# Patient Record
Sex: FEMALE | Race: WHITE | URBAN - METROPOLITAN AREA
[De-identification: names, ages, dates, MRNs, and addresses within clinical notes are randomized per-mention and may not be internally consistent; named-entity substitution may affect disease eponyms.]

---

## 2017-04-26 ENCOUNTER — OFFICE VISIT (OUTPATIENT)
Dept: OPTOMETRY | Facility: CLINIC | Age: 47
End: 2017-04-26
Payer: COMMERCIAL

## 2017-04-26 DIAGNOSIS — H52.13 MYOPIA WITH PRESBYOPIA, BILATERAL: Primary | ICD-10-CM

## 2017-04-26 DIAGNOSIS — H52.4 MYOPIA WITH PRESBYOPIA, BILATERAL: Primary | ICD-10-CM

## 2017-04-26 PROCEDURE — 92014 COMPRE OPH EXAM EST PT 1/>: CPT | Performed by: OPTOMETRIST

## 2017-04-26 PROCEDURE — 92015 DETERMINE REFRACTIVE STATE: CPT | Performed by: OPTOMETRIST

## 2017-04-26 ASSESSMENT — VISUAL ACUITY
OS_SC: 20/40
OS_CC: 20/20
OD_SC: 20/40
OS_CC: 20/80
OD_SC: 20/60
OD_CC: 20/60
METHOD: SNELLEN - LINEAR
OS_SC: 20/60
OD_SC+: +2
OD_CC: 20/25
CORRECTION_TYPE: GLASSES

## 2017-04-26 ASSESSMENT — CONF VISUAL FIELD
OD_NORMAL: 1
OS_NORMAL: 1
METHOD: COUNTING FINGERS

## 2017-04-26 ASSESSMENT — REFRACTION_MANIFEST
OS_SPHERE: -1.00
OS_SPHERE: -0.75
OS_CYLINDER: +0.25
OS_ADD: +1.50
METHOD_AUTOREFRACTION: 1
OD_ADD: +1.50
OS_AXIS: 061
OD_SPHERE: -0.75
OD_SPHERE: -1.00

## 2017-04-26 ASSESSMENT — REFRACTION_WEARINGRX
OD_ADD: +1.50
OD_CYLINDER: +0.50
OD_SPHERE: -1.00
OS_ADD: +1.50
SPECS_TYPE: PAL
OS_SPHERE: -0.75
OD_AXIS: 175

## 2017-04-26 ASSESSMENT — EXTERNAL EXAM - LEFT EYE: OS_EXAM: NORMAL

## 2017-04-26 ASSESSMENT — EXTERNAL EXAM - RIGHT EYE: OD_EXAM: NORMAL

## 2017-04-26 ASSESSMENT — CUP TO DISC RATIO
OD_RATIO: 0.4 UD
OS_RATIO: 0.4 UD

## 2017-04-26 ASSESSMENT — TONOMETRY
IOP_METHOD: APPLANATION
OS_IOP_MMHG: 21
OD_IOP_MMHG: 20

## 2017-04-26 ASSESSMENT — SLIT LAMP EXAM - LIDS
COMMENTS: NORMAL
COMMENTS: NORMAL

## 2017-04-26 NOTE — PROGRESS NOTES
Chief Complaint   Patient presents with     COMPREHENSIVE EYE EXAM         Pt will feel pressure behind left eye and will feel pain.  Pt feels like she has a film over eyes.  Not constant and happens one eye at a time.  Both symptoms come and go    Last Eye Exam: 3/10/2016  Dilated Previously: Yes    What are you currently using to see?  glasses       Distance Vision Acuity: Noticed gradual change in both eyes    Near Vision Acuity: Not satisfied     Eye Comfort: dry  Do you use eye drops? : No  Occupation or Hobbies: Processes medical claims    Bernadine Brionesquist  [employer]            Medical, surgical and family histories reviewed and updated 4/26/2017.       OBJECTIVE: See Ophthalmology exam    ASSESSMENT:    ICD-10-CM    1. Myopia with presbyopia, bilateral H52.13 EYE EXAM (SIMPLE-NONBILLABLE)    H52.4 REFRACTION      PLAN:     Patient Instructions   There was a very small change in the prescription for your glasses.    Your eyes may be blurry at near and sensitive to light for several hours from the dilating drops.    Yearly eye exams recommended.

## 2017-04-26 NOTE — MR AVS SNAPSHOT
After Visit Summary   4/26/2017    Nahed Renteria    MRN: [id number]           Patient Information     Date Of Birth          1970        Visit Information        Provider Department      4/26/2017 3:30 PM Evette Ricci OD [facility]        Today's Diagnoses     Myopia with presbyopia, bilateral    -  1      Care Instructions    There was a very small change in the prescription for your glasses.    Your eyes may be blurry at near and sensitive to light for several hours from the dilating drops.    Yearly eye exams recommended.        Follow-ups after your visit        Follow-up notes from your care team     Return in about 1 year (around 4/26/2018) for comprehensive eye exam.      Who to contact     If you have questions or need follow up information about today's clinic visit or your schedule please contact [facility] directly at [phone].  Normal or non-critical lab and imaging results will be communicated to you by MyChart, letter or phone within 4 business days after the clinic has received the results. If you do not hear from us within 7 days, please contact the clinic through oLyfehart or phone. If you have a critical or abnormal lab result, we will notify you by phone as soon as possible.  Submit refill requests through [employer] or call your pharmacy and they will forward the refill request to us. Please allow 3 business days for your refill to be completed.          Additional Information About Your Visit        MyChart Information     [employer] gives you secure access to your electronic health record. If you see a primary care provider, you can also send messages to your care team and make appointments. If you have questions, please call your primary care clinic.  If you do not have a primary care provider, please call [phone] and they will assist you.        Care EveryWhere ID     This is your Care EveryWhere ID. This could be used  by other organizations to access your [place] medical records  [id number]         Blood Pressure from Last 3 Encounters:   07/29/15 115/80   06/22/15 121/86   06/08/15 (!) 114/91    Weight from Last 3 Encounters:   09/09/15 85.9 kg (189 lb 6.4 oz)   08/12/15 85.4 kg (188 lb 3.2 oz)   07/29/15 86.2 kg (190 lb)              We Performed the Following     EYE EXAM (SIMPLE-NONBILLABLE)     REFRACTION        Primary Care Provider Office Phone # Fax #    Aileen Shayna Michel [phone]       [facility] [address]  [facility] [address]        Thank you!     Thank you for choosing [facility]  for your care. Our goal is always to provide you with excellent care. Hearing back from our patients is one way we can continue to improve our services. Please take a few minutes to complete the written survey that you may receive in the mail after your visit with us. Thank you!             Your Updated Medication List - Protect others around you: Learn how to safely use, store and throw away your medicines at www.disposemymeds.org.          This list is accurate as of: 4/26/17 11:59 PM.  Always use your most recent med list.                   Brand Name Dispense Instructions for use    ALEVE PO          order for DME     1 Device    Equipment being ordered: lacing velcro ankle sleeve - RIGHT.

## 2017-04-27 NOTE — PATIENT INSTRUCTIONS
There was a very small change in the prescription for your glasses.    Your eyes may be blurry at near and sensitive to light for several hours from the dilating drops.    Yearly eye exams recommended.

## 2018-02-03 ENCOUNTER — HEALTH MAINTENANCE LETTER (OUTPATIENT)
Age: 48
End: 2018-02-03

## 2018-05-31 ENCOUNTER — OFFICE VISIT (OUTPATIENT)
Dept: OPTOMETRY | Facility: CLINIC | Age: 48
End: 2018-05-31
Payer: COMMERCIAL

## 2018-05-31 DIAGNOSIS — H52.4 MYOPIA WITH ASTIGMATISM AND PRESBYOPIA, BILATERAL: Primary | ICD-10-CM

## 2018-05-31 DIAGNOSIS — H52.13 MYOPIA WITH ASTIGMATISM AND PRESBYOPIA, BILATERAL: Primary | ICD-10-CM

## 2018-05-31 DIAGNOSIS — H52.203 MYOPIA WITH ASTIGMATISM AND PRESBYOPIA, BILATERAL: Primary | ICD-10-CM

## 2018-05-31 DIAGNOSIS — G43.109 OCULAR MIGRAINE: ICD-10-CM

## 2018-05-31 PROCEDURE — 92015 DETERMINE REFRACTIVE STATE: CPT | Performed by: OPTOMETRIST

## 2018-05-31 PROCEDURE — 92014 COMPRE OPH EXAM EST PT 1/>: CPT | Performed by: OPTOMETRIST

## 2018-05-31 ASSESSMENT — VISUAL ACUITY
OS_SC: 20/30
OD_SC: 20/80
METHOD: SNELLEN - LINEAR
OS_SC: 20/80
OD_SC+: -1
OD_SC: 20/30

## 2018-05-31 ASSESSMENT — REFRACTION_MANIFEST
OD_CYLINDER: SPHERE
METHOD_AUTOREFRACTION: 1
OS_AXIS: 066
OS_AXIS: 082
OD_ADD: +2.00
OS_SPHERE: -1.00
OD_SPHERE: -0.75
OD_SPHERE: -0.50
OS_CYLINDER: +0.50
OS_CYLINDER: +0.25
OS_ADD: +2.00
OS_SPHERE: -1.00

## 2018-05-31 ASSESSMENT — TONOMETRY
OD_IOP_MMHG: 15
IOP_METHOD: APPLANATION
OS_IOP_MMHG: 11

## 2018-05-31 ASSESSMENT — REFRACTION_WEARINGRX
OS_ADD: +1.50
OS_SPHERE: -0.75
OD_SPHERE: -0.75
OD_ADD: +1.50

## 2018-05-31 ASSESSMENT — CONF VISUAL FIELD
OS_NORMAL: 1
OD_NORMAL: 1
METHOD: COUNTING FINGERS

## 2018-05-31 ASSESSMENT — EXTERNAL EXAM - RIGHT EYE: OD_EXAM: NORMAL

## 2018-05-31 ASSESSMENT — CUP TO DISC RATIO
OS_RATIO: 0.4 UD
OD_RATIO: 0.4 UD

## 2018-05-31 ASSESSMENT — SLIT LAMP EXAM - LIDS
COMMENTS: NORMAL
COMMENTS: NORMAL

## 2018-05-31 ASSESSMENT — EXTERNAL EXAM - LEFT EYE: OS_EXAM: NORMAL

## 2018-05-31 NOTE — PROGRESS NOTES
Chief Complaint   Patient presents with     COMPREHENSIVE EYE EXAM         Last Eye Exam: 4/26/2017  Dilated Previously: Yes    What are you currently using to see?  Glasses - Nahed is avoiding her glasses - she wears to work or drive at night.  Finds her eyes get really dry when she wears her glasses.    While Pt was doing EOM - she had discomfort in the left eye.      Distance Vision Acuity: Not satisfied    Near Vision Acuity: Not satisfied     Eye Comfort: Pt has like a film over eye and sees an aura.  Pt says can last up to 30 minutes and is hard for her to focus - mainly left eye.  Pt says it happens every other week.  Do you use eye drops? : No  Occupation or Hobbies:  - walking [employer]     Bernadine Zaiseoul  [employer]            Medical, surgical and family histories reviewed and updated 5/31/2018.       OBJECTIVE: See Ophthalmology exam    ASSESSMENT:    ICD-10-CM    1. Myopia with astigmatism and presbyopia, bilateral H52.13 EYE EXAM (SIMPLE-NONBILLABLE)    H52.203 REFRACTION    H52.4    2. Ocular migraine G43.109 EYE EXAM (SIMPLE-NONBILLABLE)      PLAN:     Patient Instructions   Prescription given for glasses.    Bring glasses in to see if they are a digital lens.    Your eyes may be blurry at near and sensitive to light for several hours from the dilating drops.    Yearly eye exams recommended or sooner if concerns.

## 2018-05-31 NOTE — LETTER
5/31/2018         RE: Nahed Renteria  [address]  [facility] [address]        Dear Colleague,    Thank you for referring your patient, Nahed Renteria, to [facility]. Please see a copy of my visit note below.    Chief Complaint   Patient presents with     COMPREHENSIVE EYE EXAM         Last Eye Exam: 4/26/2017  Dilated Previously: Yes    What are you currently using to see?  Glasses - Nahed is avoiding her glasses - she wears to work or drive at night.  Finds her eyes get really dry when she wears her glasses.    While Pt was doing EOM - she had discomfort in the left eye.      Distance Vision Acuity: Not satisfied    Near Vision Acuity: Not satisfied     Eye Comfort: Pt has like a film over eye and sees an aura.  Pt says can last up to 30 minutes and is hard for her to focus - mainly left eye.  Pt says it happens every other week.  Do you use eye drops? : No  Occupation or Hobbies:  - [employer]            Medical, surgical and family histories reviewed and updated 5/31/2018.       OBJECTIVE: See Ophthalmology exam    ASSESSMENT:    ICD-10-CM    1. Myopia with astigmatism and presbyopia, bilateral H52.13 EYE EXAM (SIMPLE-NONBILLABLE)    H52.203 REFRACTION    H52.4    2. Ocular migraine G43.109 EYE EXAM (SIMPLE-NONBILLABLE)      PLAN:     Patient Instructions   Prescription given for glasses.    Bring glasses in to see if they are a digital lens.    Your eyes may be blurry at near and sensitive to light for several hours from the dilating drops.    Yearly eye exams recommended or sooner if concerns.         Again, thank you for allowing me to participate in the care of your patient.        Sincerely,        Evette Ricci, OD

## 2018-05-31 NOTE — MR AVS SNAPSHOT
After Visit Summary   5/31/2018    Nahed Renteria    MRN: [id number]           Patient Information     Date Of Birth          1970        Visit Information        Provider Department      5/31/2018 4:00 PM Evette Ricci OD [facility]        Today's Diagnoses     Myopia with astigmatism and presbyopia, bilateral    -  1    Ocular migraine          Care Instructions    Prescription given for glasses.    Bring glasses in to see if they are a digital lens.    Your eyes may be blurry at near and sensitive to light for several hours from the dilating drops.    Yearly eye exams recommended or sooner if concerns.          Follow-ups after your visit        Who to contact     If you have questions or need follow up information about today's clinic visit or your schedule please contact [facility] directly at [phone].  Normal or non-critical lab and imaging results will be communicated to you by Kaeuferportalhart, letter or phone within 4 business days after the clinic has received the results. If you do not hear from us within 7 days, please contact the clinic through Kaeuferportalhart or phone. If you have a critical or abnormal lab result, we will notify you by phone as soon as possible.  Submit refill requests through [employer] or call your pharmacy and they will forward the refill request to us. Please allow 3 business days for your refill to be completed.          Additional Information About Your Visit        MyChart Information     [employer] gives you secure access to your electronic health record. If you see a primary care provider, you can also send messages to your care team and make appointments. If you have questions, please call your primary care clinic.  If you do not have a primary care provider, please call [phone] and they will assist you.        Care EveryWhere ID     This is your Care EveryWhere ID. This could be used by other organizations to access  your [place] medical records  [id number]         Blood Pressure from Last 3 Encounters:   07/29/15 115/80   06/22/15 121/86   06/08/15 (!) 114/91    Weight from Last 3 Encounters:   09/09/15 85.9 kg (189 lb 6.4 oz)   08/12/15 85.4 kg (188 lb 3.2 oz)   07/29/15 86.2 kg (190 lb)              We Performed the Following     EYE EXAM (SIMPLE-NONBILLABLE)     REFRACTION        Primary Care Provider Office Phone # Fax Caesar Corral David Michel [phone]       [address]  [facility] [address]        Equal Access to Services     JAMES ROSA : Hadii aad ku hadasho Soomaali, waaxda luqadaha, qaybta kaalmada adeegyada, waxay idiin hayaan adeasha cabrera . So [facility] [phone].    ATENCIÓN: Si habla español, tiene a pena disposición servicios gratuitos de asistencia lingüística. Llame al [phone].    We comply with applicable federal civil rights laws and [place] laws. We do not discriminate on the basis of race, color, national origin, age, disability, sex, sexual orientation, or gender identity.            Thank you!     Thank you for choosing [facility]  for your care. Our goal is always to provide you with excellent care. Hearing back from our patients is one way we can continue to improve our services. Please take a few minutes to complete the written survey that you may receive in the mail after your visit with us. Thank you!             Your Updated Medication List - Protect others around you: Learn how to safely use, store and throw away your medicines at www.disposemymeds.org.          This list is accurate as of 5/31/18  5:10 PM.  Always use your most recent med list.                   Brand Name Dispense Instructions for use Diagnosis    ALEVE PO           order for DME     1 Device    Equipment being ordered: lacing velcro ankle sleeve - RIGHT.    Right ankle injury, subsequent encounter       RANITIDINE HCL PO

## 2018-05-31 NOTE — PATIENT INSTRUCTIONS
Prescription given for glasses.    Bring glasses in to see if they are a digital lens.    Your eyes may be blurry at near and sensitive to light for several hours from the dilating drops.    Yearly eye exams recommended or sooner if concerns.

## 2019-11-03 ENCOUNTER — HEALTH MAINTENANCE LETTER (OUTPATIENT)
Age: 49
End: 2019-11-03

## 2020-02-10 ENCOUNTER — HEALTH MAINTENANCE LETTER (OUTPATIENT)
Age: 50
End: 2020-02-10

## 2020-11-16 ENCOUNTER — HEALTH MAINTENANCE LETTER (OUTPATIENT)
Age: 50
End: 2020-11-16

## 2021-02-07 ENCOUNTER — HEALTH MAINTENANCE LETTER (OUTPATIENT)
Age: 51
End: 2021-02-07

## 2021-09-18 ENCOUNTER — HEALTH MAINTENANCE LETTER (OUTPATIENT)
Age: 51
End: 2021-09-18

## 2022-01-08 ENCOUNTER — HEALTH MAINTENANCE LETTER (OUTPATIENT)
Age: 52
End: 2022-01-08

## 2022-03-05 ENCOUNTER — HEALTH MAINTENANCE LETTER (OUTPATIENT)
Age: 52
End: 2022-03-05

## 2022-11-19 ENCOUNTER — HEALTH MAINTENANCE LETTER (OUTPATIENT)
Age: 52
End: 2022-11-19

## 2023-04-15 ENCOUNTER — HEALTH MAINTENANCE LETTER (OUTPATIENT)
Age: 53
End: 2023-04-15